# Patient Record
(demographics unavailable — no encounter records)

---

## 2025-07-23 NOTE — HISTORY OF PRESENT ILLNESS
[Currently Active] : currently active [Men] : men [Condoms] : Condoms [TextBox_4] : Nila is a 16 y/o G0 who presents today to discuss the possibility of starting birth control.  She is sexually active and using condoms. She is unsure if she ever received the Gardasil vaccine.  She will be attending . [facility] this Fall [FreeTextEntry1] : 7/15/25

## 2025-07-23 NOTE — DISCUSSION/SUMMARY
[FreeTextEntry1] : 1) pt is using condoms consistently. Has concerns about how COCs may affect her mood as she is already on Lexapro. Defers to start hormonal contraception at this time 2) she was advised to confirm with a parent if she received Gardasil from Pediatrician. benefits of Gardasil vaccination reviewed 3) vaginal cultures obtained  return to office in one year, sooner prn

## 2025-07-23 NOTE — PHYSICAL EXAM
[Appropriately responsive] : appropriately responsive [Alert] : alert [No Acute Distress] : no acute distress [No Lymphadenopathy] : no lymphadenopathy [Oriented x3] : oriented x3 [Examination Of The Breasts] : a normal appearance [No Discharge] : no discharge [No Masses] : no breast masses were palpable [Labia Majora] : normal [Labia Minora] : normal [Normal] : normal [FreeTextEntry4] : deferred [FreeTextEntry5] : deferred [FreeTextEntry6] : deferred